# Patient Record
Sex: FEMALE | Race: WHITE | NOT HISPANIC OR LATINO | Employment: OTHER | URBAN - METROPOLITAN AREA
[De-identification: names, ages, dates, MRNs, and addresses within clinical notes are randomized per-mention and may not be internally consistent; named-entity substitution may affect disease eponyms.]

---

## 2017-01-03 ENCOUNTER — ALLSCRIPTS OFFICE VISIT (OUTPATIENT)
Dept: OTHER | Facility: OTHER | Age: 77
End: 2017-01-03

## 2017-01-03 ENCOUNTER — GENERIC CONVERSION - ENCOUNTER (OUTPATIENT)
Dept: OTHER | Facility: OTHER | Age: 77
End: 2017-01-03

## 2018-01-11 NOTE — PROCEDURES
Procedures by Maribel Miller DO at 10/12/2016  2:06 PM      Author:  Maribel Miller DO Service:  Pulmonology Author Type:  Physician     Filed:  10/12/2016  2:16 PM Date of Service:  10/12/2016  2:06 PM Status:  Signed     :  Maribel Miller DO (Physician)         Pre-procedure Diagnoses:       1  Recurrent right pleural effusion [J90]                Post-procedure Diagnoses:       1  Recurrent right pleural effusion [J90]                Procedures:       1  INSERTION / PLACEMENT PLEURAL CATHETER [CIP573 (Custom)]                   ASEPT Catheter Procedure Note    Indications: Recurrent right Pleural Effusion    Procedure performed: Right ASEPT catheter placement under US guidance    Procedure Details:     Consent: Informed consent was obtained  Risks of the procedure were discussed including: infection, bleeding, pain, pneumothorax and injury to surrounding structures  Timeout was observed  Pleural effusion identified with ultrasound immediately prior to procedure  Images saved in medical record  Site marked  Under sterile conditions the patient was positioned  Chlorhexidine swab and sterile drapes were utilized  10 mL 1% lidocaine was used to anesthetize skin and subcutaneous tissue at approximately the 6th intercostal space at the anterior axillary line  Needle was passed into the pleural space and fluid was obtained  Guidewire was passed into pleural space through the needle  Needle was withdrawn  An additional 10 mL of 1% lidocaine was used to anesthetize the skin and subcutaneous tissue approximately  4cm cm caudad from the guidewire insertion site  The tract was anesthetized up to the guidewire site  Two 1-cm incisions were made, the first of the guidewire incision site and the second 4 cm caudad  ASEPT catheter was loaded onto a guiding trocar and advanced through the inferior incision and passed through subcutaneous tissue and exiting the superior incision   Serial dilators were passed over the guidewire, followed by loading dilator with peel-away  sheath  Catheter was passed into the sheath which was then peeled away  Catheter noted to be in good position  Sutured in place  Using standard technique, the catheter was accessed and 1 6 L of fluid was removed  The site was dressed upon completion of the procedure  Findings:  Successful placement of catheter  1 6L ml of pleural fluid was obtained  Complications:  None; patient tolerated the procedure well      Estimated blood loss: minimal    Condition: stable    Plan  A follow up chest x-ray was ordered                                      Received for:Marion Sen DO  Oct 12 2016  2:16PM [facility] Standard Time

## 2018-01-11 NOTE — PROGRESS NOTES
Discussion/Summary  Social Work-Discussion Summary [facility]: Patient is being seen for an initial assessment   MSW received a referral from Erin Powell in regard to patient who may need hospice in current or new SNF  Patient's  may need SNF placement due to Dementia  MSW called and left message for daughter Ho Pierre to call [facility] to assist with SNF application and placement unless Ho Pierre wants to place them near her home  MSW left contact information for Ho Pierre to call for information and support        Signatures   Electronically signed by : BAUDILIO Sylvester; Delmer  3 2017  3:47PM EST                       (Author)

## 2018-01-14 VITALS
BODY MASS INDEX: 26.5 KG/M2 | DIASTOLIC BLOOD PRESSURE: 60 MMHG | SYSTOLIC BLOOD PRESSURE: 120 MMHG | OXYGEN SATURATION: 86 % | RESPIRATION RATE: 18 BRPM | TEMPERATURE: 97.6 F | HEART RATE: 76 BPM | HEIGHT: 62 IN | WEIGHT: 144 LBS

## 2018-01-15 NOTE — PROCEDURES
Procedures by Waldo Gilbert DO at 9/29/2016   4:11 PM      Author:  Waldo Gilbert DO Service:  Pulmonology Author Type:  Physician     Filed:  9/29/2016  4:20 PM Date of Service:  9/29/2016  4:11 PM Status:  Signed     :  Waldo Gilbert DO (Physician)            Thoracentesis Procedure Note    Pre-operative Diagnosis: *Large right pleural effusion**    Post-operative Diagnosis: same    Indications: *Diagnostic and therapeutic  **    Procedure Details the patient's right chest was dull to percussion to the level of the scapula  The ninth intercostal space was used  Consent: Informed consent was obtained  Risks of the procedure were discussed including: infection, bleeding, pain, pneumothorax  Under sterile conditions the patient was positioned  Betadine solution and sterile drapes were utilized  1% buffered lidocaine was used to anesthetize the *ninth** rib space  Fluid was obtained without any difficulties and minimal blood loss  1750 mL  of clear slightly blood-tinged fluid was obtained  There were no immediate complications  A dressing was applied to the wound and wound care instructions were provided  Findings  *1750** ml of clear pleural fluid was obtained  A sample was sent to Pathology for cytogenetics, flow, and cell counts, as well as for infection analysis  Complications:  None; patient tolerated the procedure well  Condition: stable    Plan  A follow up chest x-ray was ordered  Bed Rest for 0 hours  Tylenol 650 mg  for pain      Attending Attestation: I was present for the entire procedure                 Received for:[facility] DO  Sep 29 2016  4:20PM [facility] Standard Time

## 2018-01-17 NOTE — PROCEDURES
Procedures by Naveen Weiss DO at 10/3/2016  10:37 AM      Author:  Naveen Weiss DO Service:  Pulmonology Author Type:  Physician     Filed:  10/3/2016 10:39 AM Date of Service:  10/3/2016 10:37 AM Status:  Signed     :  Naveen Weiss DO (Physician)            Thoracentesis Procedure Note    Pre-operative Diagnosis: *Right pleural effusion**    Post-operative Diagnosis: same    Indications: **Therapeutic*    Procedure Details     Consent: Informed consent was obtained  Risks of the procedure were discussed including: infection, bleeding, pain, pneumothorax  Under sterile conditions the patient was positioned  Betadine solution and sterile drapes were utilized  1% buffered lidocaine was used to anesthetize the *10th** rib space  Fluid was obtained without any difficulties and minimal blood loss  A dressing  was applied to the wound and wound care instructions were provided  Findings  *1700** ml of clear pleural fluid was obtained  A sample was sent to Pathology for cytogenetics, flow, and cell counts, as well as for infection analysis  Complications:  None; patient tolerated the procedure well  Condition: stable    Plan  A follow up chest x-ray was ordered  Bed Rest for 0 hours  Tylenol 650 mg  for pain  Attending Attestation: I performed the procedure                   Received for:Alison Rosas DO  Oct  3 2016 10:39AM [facility] Standard Time